# Patient Record
Sex: FEMALE | Race: WHITE
[De-identification: names, ages, dates, MRNs, and addresses within clinical notes are randomized per-mention and may not be internally consistent; named-entity substitution may affect disease eponyms.]

---

## 2017-12-29 ENCOUNTER — HOSPITAL ENCOUNTER (OUTPATIENT)
Dept: HOSPITAL 47 - ORWHC2ENDO | Age: 62
Discharge: HOME | End: 2017-12-29
Payer: COMMERCIAL

## 2017-12-29 VITALS — RESPIRATION RATE: 16 BRPM | TEMPERATURE: 97 F

## 2017-12-29 VITALS — BODY MASS INDEX: 22.5 KG/M2

## 2017-12-29 VITALS — DIASTOLIC BLOOD PRESSURE: 62 MMHG | SYSTOLIC BLOOD PRESSURE: 114 MMHG

## 2017-12-29 VITALS — HEART RATE: 73 BPM

## 2017-12-29 DIAGNOSIS — Z79.82: ICD-10-CM

## 2017-12-29 DIAGNOSIS — E07.9: ICD-10-CM

## 2017-12-29 DIAGNOSIS — K52.9: Primary | ICD-10-CM

## 2017-12-29 DIAGNOSIS — Z88.0: ICD-10-CM

## 2017-12-29 DIAGNOSIS — Z88.2: ICD-10-CM

## 2017-12-29 LAB — GLIADIN IGA SER-ACNC: 0.7 U/ML

## 2017-12-29 PROCEDURE — 83516 IMMUNOASSAY NONANTIBODY: CPT

## 2017-12-29 PROCEDURE — 45380 COLONOSCOPY AND BIOPSY: CPT

## 2017-12-29 PROCEDURE — 88305 TISSUE EXAM BY PATHOLOGIST: CPT

## 2017-12-29 NOTE — P.PCN
Date of Procedure: 12/29/17


Procedure(s) Performed: 


BRIEF HISTORY: Patient is a 62-year-old pleasant white female, scheduled for an 

elective colonoscopy as a part of evaluation of change in bowel habits.  She is 

been having chronic diarrhea for several months duration.  Usually has 3-4 

loose watery bowel movements daily.  Denies any blood or mucus in the stool.





PROCEDURE PERFORMED: Colonoscopy with biopsies. 





PREOPERATIVE DIAGNOSIS: Chronic diarrhea 





IV sedation per Anesthesia. 





PROCEDURE: After informed consent was obtained, the patient, was brought into 

the endoscopy unit. IV sedation was administered by Anesthesia under continuous 

monitoring.  Digital rectal examination was normal. Initially the Olympus CF-

160 flexible video colonoscope was then inserted in the rectum, gradually 

advanced into the cecum without any difficulty. Careful examination was 

performed as the scope was gradually being withdrawn. Ileocecal valve and the 

appendiceal orifice were visualized and appeared normal.  Prep was excellent. 

Mucosa of the cecum, ascending colon, transverse colon, descending colon, 

sigmoid colon, and rectum appeared normal. Retroflexion was performed in the 

rectum and no lesions were seen.  Random biopsies were done from ascending and 

descending colon to rule out microscopic/collagenous colitis.  The patient 

tolerated the procedure well. 





IMPRESSION: 


Normal-appearing colon from rectum to cecum with no evidence of colitis or 

colorectal neoplasia..





RECOMMENDATIONS:  Findings of this examination were discussed with the patient 

as well as a family.  She was advised to follow with the biopsy results.  She 

can have a repeat screening colonoscopy in 10 years..

## 2018-09-06 ENCOUNTER — HOSPITAL ENCOUNTER (OUTPATIENT)
Dept: HOSPITAL 47 - RADUSWWP | Age: 63
Discharge: HOME | End: 2018-09-06
Attending: FAMILY MEDICINE
Payer: COMMERCIAL

## 2018-09-06 DIAGNOSIS — E04.1: Primary | ICD-10-CM

## 2018-09-06 PROCEDURE — 76536 US EXAM OF HEAD AND NECK: CPT

## 2018-09-07 NOTE — US
EXAMINATION TYPE: US thyroid st tissue head/neck

 

DATE OF EXAM: 9/6/2018

 

COMPARISON: NONE

 

CLINICAL HISTORY: Hashimotos Disease E06.3. Hashimoto's disease  

 

GLAND SIZE:

 

Right Lobe: 4.0 x 1.5 x 1.7 cm

** Overall Parenchyma:  homogenous

Left Lobe: 4.3 x 1.6 x 1.5 cm

** Overall Parenchyma:  homogeneous

Isthmus Thickness:  0.3 cm

 

NODULES

 

RIGHT:   # of nodules measured on right: 0

 

 

 

LEFT:    # of nodules measured on left:  1

1.  0.9 X 0.6  x 0.7 cm isoechoic solid nodule at the upper pole with poorly defined margins; .  This
 nodule is wider than tall and shows intranodular vascularity.

** Prior size: no prior  

 

 

ISTHMUS:    # of nodules measured in the isthmus:  0

 

 

Bilateral neck scanned, benign lymph nodes noted right neck 

 

Right gland is normal in size and fairly homogeneous in echotexture, there is 9 mm slightly hypoechoi
c solid nodule left thyroid lobe marked by technologist. No suspicious neck adenopathy.

 

 

 

IMPRESSION:

Normal-sized thyroid without suspicious greater than 1.0 cm thyroid nodule identified.